# Patient Record
Sex: MALE
[De-identification: names, ages, dates, MRNs, and addresses within clinical notes are randomized per-mention and may not be internally consistent; named-entity substitution may affect disease eponyms.]

---

## 2017-08-11 PROBLEM — Z00.00 ENCOUNTER FOR PREVENTIVE HEALTH EXAMINATION: Status: ACTIVE | Noted: 2017-08-11

## 2019-04-24 ENCOUNTER — APPOINTMENT (OUTPATIENT)
Dept: ORTHOPEDIC SURGERY | Facility: CLINIC | Age: 74
End: 2019-04-24
Payer: MEDICARE

## 2019-04-24 DIAGNOSIS — M17.11 UNILATERAL PRIMARY OSTEOARTHRITIS, RIGHT KNEE: ICD-10-CM

## 2019-04-24 PROCEDURE — 99214 OFFICE O/P EST MOD 30 MIN: CPT | Mod: 25

## 2019-04-24 PROCEDURE — 99204 OFFICE O/P NEW MOD 45 MIN: CPT | Mod: 25

## 2019-04-24 PROCEDURE — 20610 DRAIN/INJ JOINT/BURSA W/O US: CPT | Mod: RT

## 2019-04-24 PROCEDURE — 73564 X-RAY EXAM KNEE 4 OR MORE: CPT | Mod: RT

## 2019-04-24 NOTE — ADDENDUM
[FreeTextEntry1] : I, Mickey Figueredo, acted solely as a scribe for Dr. Mejia Kovacs on 04/24/2019.\par \par All medical record entries made by the scribe were at my, Dr. Mejia Kovacs, direction and personally dictated by me on 04/24/2019. I have reviewed the chart and agree that the record accurately reflects my personal performance of the history, physical exam, assessment and plan. I have also personally directed, reviewed, and agreed with the chart.

## 2019-04-24 NOTE — DISCUSSION/SUMMARY
[de-identified] : 73 year old male presents with right knee DJD. Discussed at length the natural history of degenerative arthritis and reviewed non-operative and operative treatment. At this point I suggested physiotherapy, NSAIDs and/or Tylenol, and corticosteroid injections prn. He elected to receive a right knee injection today. We talked about an injection. Discussed at length with the patient the planned steroid and lidocaine injection. The risks, benefits, convalescence and alternatives were reviewed. The possible side effects discussed included but were not limited to: pain, swelling, heat and redness. There symptoms are generally mild but if they are extensive then contact the office. Giving pain relievers by mouth such as NSAIDs or Tylenol can generally treat the reactions to steroid and lidocaine. Rare cases of infection have been noted. Rash, hives and itching may occur post injection. If you have muscle pain or cramps, flushing and or swelling of the face, rapid heartbeat, nausea, dizziness, fever, chills, headache, difficulty breathing, swelling in the arms or legs, or have a prickly feeling of your skin, contact a health care provider immediately. In the future if the pain becomes disabling may need total knee arthroplasty. FU in 8 weeks at my new office at [facility].

## 2019-04-24 NOTE — HISTORY OF PRESENT ILLNESS
[de-identified] : Patient is a 73 year old male who was last seen in 2016 for bilateral knee OA who presents c/o right knee pain x 3-4 weeks. patient denies any left knee pain currently\par Right patient locates pain to anterior knee, positive locking and buckling, no clicking or swelling

## 2019-04-24 NOTE — PROCEDURE
[de-identified] : The right knee was prepped with Betadine and under sterile condition the 40 mg Depo-Medrol and then 5 cc Lidocaine and 20 cc Marcaine injection was performed with a 21 gauge needle. The needle was introduced into the joint, aspiration was performed to ensure intra-articular placement and the medication was injected. Upon withdrawal of the needle the site was cleaned with alcohol and a band aid applied. The patient tolerated the injection well and there were no adverse effects. Post injection instructions included no strenuous activity for 24 hours, cryotherapy and if there are any adverse effects to contact the office.

## 2019-04-24 NOTE — PHYSICAL EXAM
[Normal] : Gait: normal [LE] : 5/5 motor strength in bilateral lower extremities [DP] : dorsalis pedis 2+ and symmetric bilaterally [PT] : posterior tibial 2+ and symmetric bilaterally [Poor Appearance] : well-appearing [Acute Distress] : not in acute distress [Obese] : not obese [de-identified] : General appearance: Well nourished, well developed, pleasant, alert, and oriented x3.\par Respiratory: Breathing not labored, in no acute distress.\par HEENT: Normocephalic. EOM intact. Sclerae are clear.\par CV: No apparent abnormalities. No lower leg edema. No varicosities. Pedal pulses are palpable.\par Neurologic: Sensation is intact to light touch in the upper and lower extremities. No muscle weakness.\par Dermatologic: No apparent skin lesions or rash.\par Spine: C spine and L spine appear normal and move freely, normal and nontender.\par Upper Extremities: Hands, wrists, and elbows are normal and move freely. Shoulders are normal and move freely. All range of motion is symmetrical.\par Normal body habitus. Pulses are palpable.\par Review of systems, please see form for complete details. Medical data sheet was reviewed.\par \par Left knee, FROM hip, no effusion, 0 - 135 , no crepitus, no medial pain, no lateral pain, no Lachman, no pivot shift, no anterior drawer, no posterior drawer, stable, anatomic alignment \par Right knee, limited ER and IR hip, no effusion, 0 - 125 , no crepitus, no medial pain, no lateral pain, no Lachman, no pivot shift, no anterior drawer, no posterior drawer, stable, varus alignment \par   [de-identified] : Xrays, taken at the office today show:\par \par Right Knee xrays:\par Standing AP, Lateral, and Merchant films:\par s/p femoral trista, DJD, decreased medial and lateral joint space, varus alignment, moderate patellofemoral arthritis\par

## 2023-01-23 ENCOUNTER — OFFICE (OUTPATIENT)
Dept: URBAN - METROPOLITAN AREA CLINIC 105 | Facility: CLINIC | Age: 78
Setting detail: OPHTHALMOLOGY
End: 2023-01-23
Payer: MEDICARE

## 2023-01-23 DIAGNOSIS — H35.3132: ICD-10-CM

## 2023-01-23 PROCEDURE — 92014 COMPRE OPH EXAM EST PT 1/>: CPT | Performed by: OPHTHALMOLOGY

## 2023-01-23 PROCEDURE — 92134 CPTRZ OPH DX IMG PST SGM RTA: CPT | Performed by: OPHTHALMOLOGY

## 2023-01-23 PROCEDURE — 92235 FLUORESCEIN ANGRPH MLTIFRAME: CPT | Performed by: OPHTHALMOLOGY

## 2023-01-23 ASSESSMENT — KERATOMETRY
OS_AXISANGLE_DEGREES: 179
OS_K2POWER_DIOPTERS: 46.75
OS_K1POWER_DIOPTERS: 44.50
OD_K2POWER_DIOPTERS: 45.75
METHOD_AUTO_MANUAL: AUTO
OD_AXISANGLE_DEGREES: 010
OD_K1POWER_DIOPTERS: 44.00

## 2023-01-23 ASSESSMENT — REFRACTION_AUTOREFRACTION
OD_SPHERE: +1.00
OS_CYLINDER: -3.25
OD_CYLINDER: -2.75
OS_AXIS: 090
OS_SPHERE: +0.25
OD_AXIS: 102

## 2023-01-23 ASSESSMENT — CONFRONTATIONAL VISUAL FIELD TEST (CVF)
OS_FINDINGS: FULL
OD_FINDINGS: FULL

## 2023-01-23 ASSESSMENT — VISUAL ACUITY
OD_BCVA: 20/80-2
OS_BCVA: 20/50-2

## 2023-01-23 ASSESSMENT — SPHEQUIV_DERIVED
OD_SPHEQUIV: -0.375
OS_SPHEQUIV: -1.375

## 2023-01-23 ASSESSMENT — SUPERFICIAL PUNCTATE KERATITIS (SPK)
OD_SPK: ABSENT
OS_SPK: ABSENT

## 2023-01-23 ASSESSMENT — AXIALLENGTH_DERIVED
OS_AL: 23.3496
OD_AL: 23.2383

## 2023-05-22 ENCOUNTER — OFFICE (OUTPATIENT)
Dept: URBAN - METROPOLITAN AREA CLINIC 105 | Facility: CLINIC | Age: 78
Setting detail: OPHTHALMOLOGY
End: 2023-05-22
Payer: MEDICARE

## 2023-05-22 DIAGNOSIS — H35.3132: ICD-10-CM

## 2023-05-22 PROCEDURE — 92134 CPTRZ OPH DX IMG PST SGM RTA: CPT | Performed by: OPHTHALMOLOGY

## 2023-05-22 PROCEDURE — 92014 COMPRE OPH EXAM EST PT 1/>: CPT | Performed by: OPHTHALMOLOGY

## 2023-05-22 PROCEDURE — 92235 FLUORESCEIN ANGRPH MLTIFRAME: CPT | Performed by: OPHTHALMOLOGY

## 2023-05-22 ASSESSMENT — REFRACTION_AUTOREFRACTION
OS_CYLINDER: -3.25
OS_AXIS: 090
OD_CYLINDER: -2.75
OS_SPHERE: +0.25
OD_SPHERE: +1.00
OD_AXIS: 102

## 2023-05-22 ASSESSMENT — KERATOMETRY
OS_K2POWER_DIOPTERS: 46.75
METHOD_AUTO_MANUAL: AUTO
OD_AXISANGLE_DEGREES: 010
OD_K1POWER_DIOPTERS: 44.00
OS_AXISANGLE_DEGREES: 179
OD_K2POWER_DIOPTERS: 45.75
OS_K1POWER_DIOPTERS: 44.50

## 2023-05-22 ASSESSMENT — VISUAL ACUITY
OD_BCVA: 20/100
OS_BCVA: 20/60-2

## 2023-05-22 ASSESSMENT — CONFRONTATIONAL VISUAL FIELD TEST (CVF)
OD_FINDINGS: FULL
OS_FINDINGS: FULL

## 2023-05-22 ASSESSMENT — SUPERFICIAL PUNCTATE KERATITIS (SPK)
OD_SPK: ABSENT
OS_SPK: ABSENT

## 2023-05-22 ASSESSMENT — SPHEQUIV_DERIVED
OS_SPHEQUIV: -1.375
OD_SPHEQUIV: -0.375

## 2023-05-22 ASSESSMENT — AXIALLENGTH_DERIVED
OS_AL: 23.3496
OD_AL: 23.2383

## 2023-06-07 ENCOUNTER — OFFICE (OUTPATIENT)
Dept: URBAN - METROPOLITAN AREA CLINIC 113 | Facility: CLINIC | Age: 78
Setting detail: OPHTHALMOLOGY
End: 2023-06-07
Payer: MEDICARE

## 2023-06-07 ENCOUNTER — RX ONLY (RX ONLY)
Age: 78
End: 2023-06-07

## 2023-06-07 DIAGNOSIS — H16.223: ICD-10-CM

## 2023-06-07 DIAGNOSIS — H35.3132: ICD-10-CM

## 2023-06-07 DIAGNOSIS — H10.45: ICD-10-CM

## 2023-06-07 DIAGNOSIS — H16.222: ICD-10-CM

## 2023-06-07 DIAGNOSIS — H16.221: ICD-10-CM

## 2023-06-07 DIAGNOSIS — H35.033: ICD-10-CM

## 2023-06-07 DIAGNOSIS — H01.004: ICD-10-CM

## 2023-06-07 DIAGNOSIS — H01.001: ICD-10-CM

## 2023-06-07 PROCEDURE — 92014 COMPRE OPH EXAM EST PT 1/>: CPT | Performed by: OPHTHALMOLOGY

## 2023-06-07 PROCEDURE — 83861 MICROFLUID ANALY TEARS: CPT | Performed by: OPHTHALMOLOGY

## 2023-06-07 PROCEDURE — 92250 FUNDUS PHOTOGRAPHY W/I&R: CPT | Performed by: OPHTHALMOLOGY

## 2023-06-07 ASSESSMENT — REFRACTION_CURRENTRX
OD_SPHERE: +0.25
OS_AXIS: 080
OS_CYLINDER: -2.25
OD_OVR_VA: 20/
OD_AXIS: 102
OS_ADD: +2.50
OD_CYLINDER: -1.75
OS_OVR_VA: 20/
OD_ADD: +2.50
OS_SPHERE: +0.25
OD_VPRISM_DIRECTION: BF
OS_VPRISM_DIRECTION: BF

## 2023-06-07 ASSESSMENT — REFRACTION_AUTOREFRACTION
OD_CYLINDER: -3.00
OD_SPHERE: +1.00
OS_SPHERE: 0.00
OS_CYLINDER: -3.00
OD_AXIS: 093
OS_AXIS: 083

## 2023-06-07 ASSESSMENT — SUPERFICIAL PUNCTATE KERATITIS (SPK)
OD_SPK: T
OS_SPK: T

## 2023-06-07 ASSESSMENT — CONFRONTATIONAL VISUAL FIELD TEST (CVF)
OD_FINDINGS: FULL
OS_FINDINGS: FULL

## 2023-06-07 ASSESSMENT — AXIALLENGTH_DERIVED
OD_AL: 23.3305
OS_AL: 23.4426

## 2023-06-07 ASSESSMENT — TONOMETRY
OD_IOP_MMHG: 11
OS_IOP_MMHG: 12

## 2023-06-07 ASSESSMENT — KERATOMETRY
METHOD_AUTO_MANUAL: AUTO
OS_AXISANGLE_DEGREES: 174
OD_K1POWER_DIOPTERS: 43.75
OD_AXISANGLE_DEGREES: 001
OD_K2POWER_DIOPTERS: 45.75
OS_K1POWER_DIOPTERS: 44.50
OS_K2POWER_DIOPTERS: 46.50

## 2023-06-07 ASSESSMENT — SPHEQUIV_DERIVED
OS_SPHEQUIV: -1.5
OD_SPHEQUIV: -0.5

## 2023-06-07 ASSESSMENT — VISUAL ACUITY
OS_BCVA: 20/30
OD_BCVA: 20/30-1

## 2023-06-07 ASSESSMENT — LID EXAM ASSESSMENTS
OS_BLEPHARITIS: LLL LUL T
OD_BLEPHARITIS: RLL RUL T

## 2023-07-19 ENCOUNTER — OFFICE (OUTPATIENT)
Dept: URBAN - METROPOLITAN AREA CLINIC 113 | Facility: CLINIC | Age: 78
Setting detail: OPHTHALMOLOGY
End: 2023-07-19
Payer: MEDICARE

## 2023-07-19 DIAGNOSIS — H16.223: ICD-10-CM

## 2023-07-19 DIAGNOSIS — H01.004: ICD-10-CM

## 2023-07-19 DIAGNOSIS — H35.3132: ICD-10-CM

## 2023-07-19 DIAGNOSIS — H10.45: ICD-10-CM

## 2023-07-19 DIAGNOSIS — H01.001: ICD-10-CM

## 2023-07-19 DIAGNOSIS — H52.213: ICD-10-CM

## 2023-07-19 DIAGNOSIS — H35.033: ICD-10-CM

## 2023-07-19 PROCEDURE — 99213 OFFICE O/P EST LOW 20 MIN: CPT | Performed by: OPHTHALMOLOGY

## 2023-07-19 PROCEDURE — 92025 CPTRIZED CORNEAL TOPOGRAPHY: CPT | Performed by: OPHTHALMOLOGY

## 2023-07-19 ASSESSMENT — REFRACTION_CURRENTRX
OD_OVR_VA: 20/
OD_SPHERE: +0.25
OS_AXIS: 080
OD_CYLINDER: -1.75
OS_OVR_VA: 20/
OD_ADD: +2.50
OS_VPRISM_DIRECTION: BF
OS_CYLINDER: -2.25
OS_SPHERE: +0.25
OS_ADD: +2.50
OD_VPRISM_DIRECTION: BF
OD_AXIS: 102

## 2023-07-19 ASSESSMENT — CONFRONTATIONAL VISUAL FIELD TEST (CVF)
OD_FINDINGS: FULL
OS_FINDINGS: FULL

## 2023-07-19 ASSESSMENT — KERATOMETRY
OS_AXISANGLE_DEGREES: 170
OD_AXISANGLE_DEGREES: 002
METHOD_AUTO_MANUAL: AUTO
OS_K1POWER_DIOPTERS: 44.00
OD_K2POWER_DIOPTERS: 45.50
OD_K1POWER_DIOPTERS: 43.75
OS_K2POWER_DIOPTERS: 46.50

## 2023-07-19 ASSESSMENT — VISUAL ACUITY
OS_BCVA: 20/50-1
OD_BCVA: 20/50-1

## 2023-07-19 ASSESSMENT — SUPERFICIAL PUNCTATE KERATITIS (SPK)
OD_SPK: T
OS_SPK: T

## 2023-07-19 ASSESSMENT — SPHEQUIV_DERIVED
OS_SPHEQUIV: -1.25
OD_SPHEQUIV: -0.5

## 2023-07-19 ASSESSMENT — LID EXAM ASSESSMENTS
OS_BLEPHARITIS: LLL LUL T
OD_BLEPHARITIS: RLL RUL T

## 2023-07-19 ASSESSMENT — REFRACTION_AUTOREFRACTION
OD_SPHERE: +1.00
OS_AXIS: 090
OS_CYLINDER: -3.00
OS_SPHERE: +0.25
OD_AXIS: 101
OD_CYLINDER: -3.00

## 2023-07-19 ASSESSMENT — TONOMETRY
OD_IOP_MMHG: 12
OS_IOP_MMHG: 11

## 2023-07-19 ASSESSMENT — AXIALLENGTH_DERIVED
OS_AL: 23.4371
OD_AL: 23.3755

## 2023-11-15 ENCOUNTER — OFFICE (OUTPATIENT)
Dept: URBAN - METROPOLITAN AREA CLINIC 113 | Facility: CLINIC | Age: 78
Setting detail: OPHTHALMOLOGY
End: 2023-11-15
Payer: MEDICARE

## 2023-11-15 DIAGNOSIS — H01.004: ICD-10-CM

## 2023-11-15 DIAGNOSIS — H10.45: ICD-10-CM

## 2023-11-15 DIAGNOSIS — H16.222: ICD-10-CM

## 2023-11-15 DIAGNOSIS — H16.221: ICD-10-CM

## 2023-11-15 DIAGNOSIS — H16.223: ICD-10-CM

## 2023-11-15 DIAGNOSIS — H35.033: ICD-10-CM

## 2023-11-15 DIAGNOSIS — H35.3132: ICD-10-CM

## 2023-11-15 DIAGNOSIS — H01.001: ICD-10-CM

## 2023-11-15 PROCEDURE — 99213 OFFICE O/P EST LOW 20 MIN: CPT | Performed by: OPHTHALMOLOGY

## 2023-11-15 PROCEDURE — 83861 MICROFLUID ANALY TEARS: CPT | Mod: QW,RT | Performed by: OPHTHALMOLOGY

## 2023-11-15 PROCEDURE — 83861 MICROFLUID ANALY TEARS: CPT | Mod: QW,LT | Performed by: OPHTHALMOLOGY

## 2023-11-15 ASSESSMENT — REFRACTION_CURRENTRX
OD_OVR_VA: 20/
OD_VPRISM_DIRECTION: BF
OD_CYLINDER: -1.75
OD_AXIS: 102
OS_VPRISM_DIRECTION: BF
OS_ADD: +2.50
OS_SPHERE: +0.25
OD_ADD: +2.50
OS_AXIS: 080
OS_CYLINDER: -2.25
OS_OVR_VA: 20/
OD_SPHERE: +0.25

## 2023-11-15 ASSESSMENT — REFRACTION_MANIFEST
OD_VA1: 20/30-2
OD_SPHERE: +1.00
OD_CYLINDER: -2.75
OS_SPHERE: +0.25
OS_AXIS: 090
OD_AXIS: 100
OS_VA1: 20/30-2
OS_CYLINDER: -2.50
OD_ADD: +2.50
OS_ADD: +2.50

## 2023-11-15 ASSESSMENT — SPHEQUIV_DERIVED
OS_SPHEQUIV: -1.125
OS_SPHEQUIV: -1
OD_SPHEQUIV: -0.375
OD_SPHEQUIV: -0.375

## 2023-11-15 ASSESSMENT — CONFRONTATIONAL VISUAL FIELD TEST (CVF)
OS_FINDINGS: FULL
OD_FINDINGS: FULL

## 2023-11-15 ASSESSMENT — REFRACTION_AUTOREFRACTION
OD_AXIS: 100
OD_SPHERE: +1.25
OS_SPHERE: +0.25
OS_CYLINDER: -2.75
OD_CYLINDER: -3.25
OS_AXIS: 088

## 2023-11-15 ASSESSMENT — LID EXAM ASSESSMENTS
OS_BLEPHARITIS: LLL LUL T
OD_BLEPHARITIS: RLL RUL T

## 2023-11-15 ASSESSMENT — SUPERFICIAL PUNCTATE KERATITIS (SPK)
OS_SPK: T
OD_SPK: T

## 2024-02-23 ENCOUNTER — OFFICE (OUTPATIENT)
Dept: URBAN - METROPOLITAN AREA CLINIC 94 | Facility: CLINIC | Age: 79
Setting detail: OPHTHALMOLOGY
End: 2024-02-23
Payer: MEDICARE

## 2024-02-23 DIAGNOSIS — H02.132: ICD-10-CM

## 2024-02-23 DIAGNOSIS — H16.223: ICD-10-CM

## 2024-02-23 DIAGNOSIS — H35.3132: ICD-10-CM

## 2024-02-23 DIAGNOSIS — H02.135: ICD-10-CM

## 2024-02-23 DIAGNOSIS — H10.45: ICD-10-CM

## 2024-02-23 PROCEDURE — 92235 FLUORESCEIN ANGRPH MLTIFRAME: CPT | Performed by: OPHTHALMOLOGY

## 2024-02-23 PROCEDURE — 92134 CPTRZ OPH DX IMG PST SGM RTA: CPT | Performed by: OPHTHALMOLOGY

## 2024-02-23 PROCEDURE — 92012 INTRM OPH EXAM EST PATIENT: CPT | Performed by: OPHTHALMOLOGY

## 2024-02-23 ASSESSMENT — REFRACTION_CURRENTRX
OS_OVR_VA: 20/
OS_CYLINDER: -2.25
OS_VPRISM_DIRECTION: BF
OD_VPRISM_DIRECTION: BF
OS_ADD: +2.50
OD_ADD: +2.50
OD_AXIS: 102
OS_AXIS: 080
OD_SPHERE: +0.25
OD_OVR_VA: 20/
OD_CYLINDER: -1.75
OS_SPHERE: +0.25

## 2024-02-23 ASSESSMENT — REFRACTION_AUTOREFRACTION
OD_SPHERE: +0.50
OS_CYLINDER: -2.00
OS_SPHERE: -0.75
OS_AXIS: 072
OD_AXIS: 101
OD_CYLINDER: -2.25

## 2024-02-23 ASSESSMENT — LID POSITION - ECTROPION
OS_ECTROPION: LLL 1+
OD_ECTROPION: RLL 1+

## 2024-02-23 ASSESSMENT — SPHEQUIV_DERIVED
OS_SPHEQUIV: -1.75
OD_SPHEQUIV: -0.625
OS_SPHEQUIV: -1
OD_SPHEQUIV: -0.375

## 2024-02-23 ASSESSMENT — REFRACTION_MANIFEST
OS_SPHERE: +0.25
OD_AXIS: 100
OD_VA1: 20/30-2
OD_SPHERE: +1.00
OS_AXIS: 090
OS_ADD: +2.50
OD_ADD: +2.50
OS_CYLINDER: -2.50
OS_VA1: 20/30-2
OD_CYLINDER: -2.75

## 2024-02-23 ASSESSMENT — LID EXAM ASSESSMENTS
OS_BLEPHARITIS: LLL LUL T
OD_BLEPHARITIS: RLL RUL T

## 2024-02-23 ASSESSMENT — SUPERFICIAL PUNCTATE KERATITIS (SPK)
OD_SPK: T
OS_SPK: T

## 2024-05-24 ENCOUNTER — OFFICE (OUTPATIENT)
Dept: URBAN - METROPOLITAN AREA CLINIC 94 | Facility: CLINIC | Age: 79
Setting detail: OPHTHALMOLOGY
End: 2024-05-24
Payer: MEDICARE

## 2024-05-24 DIAGNOSIS — H16.221: ICD-10-CM

## 2024-05-24 DIAGNOSIS — H02.521: ICD-10-CM

## 2024-05-24 DIAGNOSIS — H02.834: ICD-10-CM

## 2024-05-24 DIAGNOSIS — H02.831: ICD-10-CM

## 2024-05-24 DIAGNOSIS — H16.222: ICD-10-CM

## 2024-05-24 DIAGNOSIS — H02.524: ICD-10-CM

## 2024-05-24 PROBLEM — H02.421 PTOSIS MYOGENIC; RIGHT EYE, LEFT EYE: Status: ACTIVE | Noted: 2024-05-24

## 2024-05-24 PROBLEM — H01.005 BLEPHARITIS; RIGHT UPPER LID, LEFT UPPER LID , RIGHT LOWER LID, LEFT LOWER LID: Status: ACTIVE | Noted: 2024-05-24

## 2024-05-24 PROBLEM — H01.004 BLEPHARITIS; RIGHT UPPER LID, LEFT UPPER LID , RIGHT LOWER LID, LEFT LOWER LID: Status: ACTIVE | Noted: 2024-05-24

## 2024-05-24 PROBLEM — H01.002 BLEPHARITIS; RIGHT UPPER LID, LEFT UPPER LID , RIGHT LOWER LID, LEFT LOWER LID: Status: ACTIVE | Noted: 2024-05-24

## 2024-05-24 PROBLEM — H02.422 PTOSIS MYOGENIC; RIGHT EYE, LEFT EYE: Status: ACTIVE | Noted: 2024-05-24

## 2024-05-24 PROBLEM — H01.001 BLEPHARITIS; RIGHT UPPER LID, LEFT UPPER LID , RIGHT LOWER LID, LEFT LOWER LID: Status: ACTIVE | Noted: 2024-05-24

## 2024-05-24 PROCEDURE — 99214 OFFICE O/P EST MOD 30 MIN: CPT | Performed by: OPHTHALMOLOGY

## 2024-05-24 PROCEDURE — 92083 EXTENDED VISUAL FIELD XM: CPT | Performed by: OPHTHALMOLOGY

## 2024-05-24 PROCEDURE — 83861 MICROFLUID ANALY TEARS: CPT | Mod: QW,RT | Performed by: OPHTHALMOLOGY

## 2024-05-24 PROCEDURE — 83861 MICROFLUID ANALY TEARS: CPT | Mod: QW,LT | Performed by: OPHTHALMOLOGY

## 2024-05-24 PROCEDURE — 92285 EXTERNAL OCULAR PHOTOGRAPHY: CPT | Performed by: OPHTHALMOLOGY

## 2024-05-24 ASSESSMENT — LID EXAM ASSESSMENTS
OS_BLEPHARITIS: LLL LUL T
OD_BLEPHARITIS: RLL RUL T

## 2024-05-24 ASSESSMENT — CONFRONTATIONAL VISUAL FIELD TEST (CVF)
OS_FINDINGS: FULL
OD_FINDINGS: FULL

## 2024-06-05 ENCOUNTER — ASC (OUTPATIENT)
Dept: URBAN - METROPOLITAN AREA SURGERY 8 | Facility: SURGERY | Age: 79
Setting detail: OPHTHALMOLOGY
End: 2024-06-05
Payer: MEDICARE

## 2024-06-05 DIAGNOSIS — H02.421: ICD-10-CM

## 2024-06-05 DIAGNOSIS — H04.422: ICD-10-CM

## 2024-06-05 DIAGNOSIS — H02.524: ICD-10-CM

## 2024-06-05 DIAGNOSIS — H02.521: ICD-10-CM

## 2024-06-05 PROCEDURE — 67904 REPAIR EYELID DEFECT: CPT | Mod: 50 | Performed by: OPHTHALMOLOGY

## 2024-06-05 PROCEDURE — 67900 REPAIR BROW DEFECT: CPT | Mod: 50 | Performed by: OPHTHALMOLOGY

## 2024-06-06 ENCOUNTER — RX ONLY (RX ONLY)
Age: 79
End: 2024-06-06

## 2024-06-06 ENCOUNTER — OFFICE (OUTPATIENT)
Dept: URBAN - METROPOLITAN AREA CLINIC 112 | Facility: CLINIC | Age: 79
Setting detail: OPHTHALMOLOGY
End: 2024-06-06
Payer: MEDICARE

## 2024-06-06 DIAGNOSIS — H02.521: ICD-10-CM

## 2024-06-06 DIAGNOSIS — H02.524: ICD-10-CM

## 2024-06-06 DIAGNOSIS — H02.834: ICD-10-CM

## 2024-06-06 DIAGNOSIS — H02.421: ICD-10-CM

## 2024-06-06 DIAGNOSIS — H02.422: ICD-10-CM

## 2024-06-06 PROCEDURE — 99024 POSTOP FOLLOW-UP VISIT: CPT | Performed by: PHYSICIAN ASSISTANT

## 2024-06-06 ASSESSMENT — LID EXAM ASSESSMENTS
OD_BLEPHARITIS: RLL RUL T
OS_BLEPHARITIS: LLL LUL T

## 2024-06-06 ASSESSMENT — CONFRONTATIONAL VISUAL FIELD TEST (CVF)
OD_FINDINGS: FULL
OS_FINDINGS: FULL

## 2024-06-27 ENCOUNTER — OFFICE (OUTPATIENT)
Dept: URBAN - METROPOLITAN AREA CLINIC 105 | Facility: CLINIC | Age: 79
Setting detail: OPHTHALMOLOGY
End: 2024-06-27
Payer: MEDICARE

## 2024-06-27 DIAGNOSIS — H02.421: ICD-10-CM

## 2024-06-27 DIAGNOSIS — H02.524: ICD-10-CM

## 2024-06-27 DIAGNOSIS — H02.422: ICD-10-CM

## 2024-06-27 DIAGNOSIS — H02.521: ICD-10-CM

## 2024-06-27 DIAGNOSIS — H02.834: ICD-10-CM

## 2024-06-27 PROBLEM — H01.004 BLEPHARITIS; RIGHT UPPER LID, LEFT UPPER LID , RIGHT LOWER LID, LEFT LOWER LID: Status: ACTIVE | Noted: 2024-06-27

## 2024-06-27 PROBLEM — H01.002 BLEPHARITIS; RIGHT UPPER LID, LEFT UPPER LID , RIGHT LOWER LID, LEFT LOWER LID: Status: ACTIVE | Noted: 2024-06-27

## 2024-06-27 PROBLEM — H01.001 BLEPHARITIS; RIGHT UPPER LID, LEFT UPPER LID , RIGHT LOWER LID, LEFT LOWER LID: Status: ACTIVE | Noted: 2024-06-27

## 2024-06-27 PROBLEM — H01.005 BLEPHARITIS; RIGHT UPPER LID, LEFT UPPER LID , RIGHT LOWER LID, LEFT LOWER LID: Status: ACTIVE | Noted: 2024-06-27

## 2024-06-27 PROCEDURE — 99024 POSTOP FOLLOW-UP VISIT: CPT | Performed by: REGISTERED NURSE

## 2024-06-27 ASSESSMENT — LID EXAM ASSESSMENTS
OS_BLEPHARITIS: LLL LUL T
OD_BLEPHARITIS: RLL RUL T

## 2024-06-27 ASSESSMENT — CONFRONTATIONAL VISUAL FIELD TEST (CVF)
OS_FINDINGS: FULL
OD_FINDINGS: FULL

## 2024-09-26 ENCOUNTER — OFFICE (OUTPATIENT)
Dept: URBAN - METROPOLITAN AREA CLINIC 113 | Facility: CLINIC | Age: 79
Setting detail: OPHTHALMOLOGY
End: 2024-09-26
Payer: MEDICARE

## 2024-09-26 VITALS — HEIGHT: 60 IN

## 2024-09-26 DIAGNOSIS — H11.153: ICD-10-CM

## 2024-09-26 DIAGNOSIS — H02.135: ICD-10-CM

## 2024-09-26 PROBLEM — H16.223 DRY EYE SYNDROME K SICCA; ,, BOTH EYES: Status: ACTIVE | Noted: 2024-09-26

## 2024-09-26 PROCEDURE — 92012 INTRM OPH EXAM EST PATIENT: CPT | Performed by: OPHTHALMOLOGY

## 2024-09-26 ASSESSMENT — CONFRONTATIONAL VISUAL FIELD TEST (CVF)
OD_FINDINGS: FULL
OS_FINDINGS: FULL

## 2024-09-26 ASSESSMENT — LID EXAM ASSESSMENTS
OD_BLEPHARITIS: RLL RUL T
OS_BLEPHARITIS: LLL LUL T

## 2024-10-07 ENCOUNTER — OFFICE (OUTPATIENT)
Dept: URBAN - METROPOLITAN AREA CLINIC 103 | Facility: CLINIC | Age: 79
Setting detail: OPHTHALMOLOGY
End: 2024-10-07
Payer: MEDICARE

## 2024-10-07 DIAGNOSIS — H35.3133: ICD-10-CM

## 2024-10-07 PROCEDURE — 92235 FLUORESCEIN ANGRPH MLTIFRAME: CPT | Performed by: OPHTHALMOLOGY

## 2024-10-07 PROCEDURE — 92012 INTRM OPH EXAM EST PATIENT: CPT | Performed by: OPHTHALMOLOGY

## 2024-10-07 PROCEDURE — 92134 CPTRZ OPH DX IMG PST SGM RTA: CPT | Performed by: OPHTHALMOLOGY

## 2024-10-07 ASSESSMENT — CONFRONTATIONAL VISUAL FIELD TEST (CVF)
OD_FINDINGS: FULL
OS_FINDINGS: FULL

## 2024-10-07 ASSESSMENT — KERATOMETRY
OD_K1POWER_DIOPTERS: 43.50
OS_AXISANGLE_DEGREES: 172
OS_K1POWER_DIOPTERS: 45.00
METHOD_AUTO_MANUAL: AUTO
OD_K2POWER_DIOPTERS: 45.75
OD_AXISANGLE_DEGREES: 001
OS_K2POWER_DIOPTERS: 47.00

## 2024-10-07 ASSESSMENT — REFRACTION_CURRENTRX
OS_ADD: +2.50
OS_AXIS: 080
OS_VPRISM_DIRECTION: BF
OD_SPHERE: +0.25
OS_OVR_VA: 20/
OD_AXIS: 102
OD_OVR_VA: 20/
OS_CYLINDER: -2.25
OD_ADD: +2.50
OD_CYLINDER: -1.75
OD_VPRISM_DIRECTION: BF
OS_SPHERE: +0.25

## 2024-10-07 ASSESSMENT — VISUAL ACUITY
OD_BCVA: 20/70
OS_BCVA: 20/50

## 2024-10-07 ASSESSMENT — REFRACTION_AUTOREFRACTION
OS_SPHERE: -0.50
OD_CYLINDER: -3.50
OS_AXIS: 083
OD_SPHERE: +1.25
OD_AXIS: 098
OS_CYLINDER: -2.50

## 2024-10-07 ASSESSMENT — LID EXAM ASSESSMENTS
OS_BLEPHARITIS: LLL LUL T
OD_BLEPHARITIS: RLL RUL T

## 2024-10-07 ASSESSMENT — SUPERFICIAL PUNCTATE KERATITIS (SPK)
OD_SPK: T
OS_SPK: T

## 2024-10-07 ASSESSMENT — TONOMETRY
OD_IOP_MMHG: 11
OS_IOP_MMHG: 11

## 2025-02-11 ENCOUNTER — OFFICE (OUTPATIENT)
Dept: URBAN - METROPOLITAN AREA CLINIC 103 | Facility: CLINIC | Age: 80
Setting detail: OPHTHALMOLOGY
End: 2025-02-11
Payer: MEDICARE

## 2025-02-11 DIAGNOSIS — H35.3133: ICD-10-CM

## 2025-02-11 PROCEDURE — 92235 FLUORESCEIN ANGRPH MLTIFRAME: CPT | Performed by: SPECIALIST

## 2025-02-11 PROCEDURE — 92014 COMPRE OPH EXAM EST PT 1/>: CPT | Performed by: SPECIALIST

## 2025-02-11 PROCEDURE — 92134 CPTRZ OPH DX IMG PST SGM RTA: CPT | Performed by: SPECIALIST

## 2025-02-11 ASSESSMENT — REFRACTION_AUTOREFRACTION
OS_SPHERE: -0.50
OD_CYLINDER: -3.50
OS_CYLINDER: -2.50
OD_AXIS: 098
OS_AXIS: 083
OD_SPHERE: +1.25

## 2025-02-11 ASSESSMENT — TONOMETRY
OD_IOP_MMHG: 13
OS_IOP_MMHG: 10

## 2025-02-11 ASSESSMENT — VISUAL ACUITY
OD_BCVA: 20/60-1
OS_BCVA: 20/50-1

## 2025-02-11 ASSESSMENT — REFRACTION_CURRENTRX
OD_SPHERE: +0.25
OD_OVR_VA: 20/
OS_OVR_VA: 20/
OS_ADD: +2.50
OS_VPRISM_DIRECTION: BF
OD_CYLINDER: -1.75
OS_SPHERE: +0.25
OD_VPRISM_DIRECTION: BF
OD_AXIS: 102
OS_CYLINDER: -2.25
OD_ADD: +2.50
OS_AXIS: 080

## 2025-02-11 ASSESSMENT — KERATOMETRY
OS_K2POWER_DIOPTERS: 47.00
OD_K2POWER_DIOPTERS: 45.75
OS_AXISANGLE_DEGREES: 172
OD_K1POWER_DIOPTERS: 43.50
METHOD_AUTO_MANUAL: AUTO
OD_AXISANGLE_DEGREES: 001
OS_K1POWER_DIOPTERS: 45.00

## 2025-02-11 ASSESSMENT — CONFRONTATIONAL VISUAL FIELD TEST (CVF)
OS_FINDINGS: FULL
OD_FINDINGS: FULL

## 2025-02-11 ASSESSMENT — LID EXAM ASSESSMENTS
OS_BLEPHARITIS: LLL LUL T
OD_BLEPHARITIS: RLL RUL T

## 2025-02-11 ASSESSMENT — SUPERFICIAL PUNCTATE KERATITIS (SPK)
OS_SPK: T
OD_SPK: T

## 2025-07-22 ENCOUNTER — OFFICE (OUTPATIENT)
Dept: URBAN - METROPOLITAN AREA CLINIC 103 | Facility: CLINIC | Age: 80
Setting detail: OPHTHALMOLOGY
End: 2025-07-22
Payer: MEDICARE

## 2025-07-22 DIAGNOSIS — H35.3133: ICD-10-CM

## 2025-07-22 PROCEDURE — 92014 COMPRE OPH EXAM EST PT 1/>: CPT | Performed by: OPHTHALMOLOGY

## 2025-07-22 PROCEDURE — 92250 FUNDUS PHOTOGRAPHY W/I&R: CPT | Performed by: OPHTHALMOLOGY

## 2025-07-22 ASSESSMENT — KERATOMETRY
OS_K2POWER_DIOPTERS: 47.00
OD_K1POWER_DIOPTERS: 43.50
OS_K1POWER_DIOPTERS: 45.00
METHOD_AUTO_MANUAL: AUTO
OD_AXISANGLE_DEGREES: 001
OD_K2POWER_DIOPTERS: 45.75
OS_AXISANGLE_DEGREES: 172

## 2025-07-22 ASSESSMENT — SUPERFICIAL PUNCTATE KERATITIS (SPK)
OD_SPK: T
OS_SPK: T

## 2025-07-22 ASSESSMENT — CONFRONTATIONAL VISUAL FIELD TEST (CVF)
OS_FINDINGS: FULL
OD_FINDINGS: FULL

## 2025-07-22 ASSESSMENT — LID EXAM ASSESSMENTS
OS_BLEPHARITIS: LLL LUL T
OD_BLEPHARITIS: RLL RUL T

## 2025-07-22 ASSESSMENT — TONOMETRY
OS_IOP_MMHG: 19
OD_IOP_MMHG: 18

## 2025-07-22 ASSESSMENT — REFRACTION_AUTOREFRACTION
OS_SPHERE: -0.50
OD_SPHERE: +1.25
OS_CYLINDER: -2.50
OS_AXIS: 083
OD_AXIS: 098
OD_CYLINDER: -3.50

## 2025-07-22 ASSESSMENT — VISUAL ACUITY
OS_BCVA: 20/70
OD_BCVA: 20/70